# Patient Record
Sex: FEMALE | Race: WHITE | URBAN - METROPOLITAN AREA
[De-identification: names, ages, dates, MRNs, and addresses within clinical notes are randomized per-mention and may not be internally consistent; named-entity substitution may affect disease eponyms.]

---

## 2017-04-05 ENCOUNTER — IMPORTED ENCOUNTER (OUTPATIENT)
Dept: URBAN - METROPOLITAN AREA CLINIC 43 | Facility: CLINIC | Age: 82
End: 2017-04-05

## 2017-05-03 ENCOUNTER — IMPORTED ENCOUNTER (OUTPATIENT)
Dept: URBAN - METROPOLITAN AREA CLINIC 43 | Facility: CLINIC | Age: 82
End: 2017-05-03

## 2017-05-03 PROBLEM — H35.373: Noted: 2017-05-03

## 2017-05-03 PROBLEM — H43.813: Noted: 2017-05-03

## 2017-05-03 PROBLEM — H40.1131: Noted: 2017-05-03

## 2020-04-18 ASSESSMENT — VISUAL ACUITY
OS_CC: J1
OS_CC: J1+
OD_OTHER: 20/40.
OS_SC: 20/20 -2
OD_CC: J1+-2
OD_SC: 20/25 -2
OD_CC: J1+
OD_SC: 20/20 -3
OS_SC: 20/20 -3

## 2020-04-18 ASSESSMENT — KERATOMETRY
OS_K2POWER_DIOPTERS: 45.75
OS_AXISANGLE_DEGREES: 124
OD_AXISANGLE2_DEGREES: 173
OD_K2POWER_DIOPTERS: 45
OS_K1POWER_DIOPTERS: 46
OD_K1POWER_DIOPTERS: 45.75
OD_AXISANGLE_DEGREES: 83
OS_AXISANGLE2_DEGREES: 34

## 2020-04-18 ASSESSMENT — TONOMETRY
OS_IOP_MMHG: 12.0
OD_IOP_MMHG: 14.0

## 2021-02-11 NOTE — PATIENT DISCUSSION
BASELINE PHOTOS TODAY. DISCUSSED OPTION OF SLT REVIEWED WITH PT. WILL SCHEDULE PT FOR A GONIO UNDILATED . PT WILL CONTINUE WITH THE CURRENT MEDS FOR NOW.

## 2021-03-24 NOTE — PROCEDURE NOTE: SURGICAL
"<p style=""margin-bottom:0in;text-align:justify;line-height:normal;""><span>Prior to commencing surgery

## 2021-04-22 NOTE — PATIENT DISCUSSION
DOING WELL AFTER [facility] OU. WILL CONTINUE CARE W/DR. ESCALANTE AND DPM. IF CHANGES, SEND BACK TO [address] [facility].

## 2021-11-29 NOTE — PATIENT DISCUSSION
DOING WELL AFTER LPI OU. WILL CONTINUE CARE W/DR. ESCALANTE AND DPM. IF CHANGES, SEND BACK TO [address] [facility].

## 2022-04-01 NOTE — PATIENT DISCUSSION
DOING WELL AFTER LPI OU. WILL CONTINUE CARE W/DR. ESCALANTE AND DPM. IF CHANGES, SEND BACK TO [address] [facility].

## 2022-06-04 ENCOUNTER — TELEPHONE ENCOUNTER (OUTPATIENT)
Dept: URBAN - METROPOLITAN AREA CLINIC 68 | Facility: CLINIC | Age: 87
End: 2022-06-04

## 2022-06-04 RX ORDER — RIFAXIMIN 550 MG/1
TABLET ORAL
Qty: 42 | Refills: 0 | OUTPATIENT
Start: 2016-05-31 | End: 2016-06-14

## 2022-06-04 RX ORDER — POLYETHYLENE GLYCOL 3350, SODIUM SULFATE, SODIUM CHLORIDE, POTASSIUM CHLORIDE, ASCORBIC ACID, SODIUM ASCORBATE 7.5-2.691G
KIT ORAL
Qty: 1 | Refills: 0 | OUTPATIENT
Start: 2013-11-12 | End: 2015-02-12

## 2022-06-04 RX ORDER — AMOXICILLIN AND CLAVULANATE POTASSIUM 875; 125 MG/1; MG/1
TABLET, FILM COATED ORAL
Qty: 28 | Refills: 0 | OUTPATIENT
Start: 2015-02-12 | End: 2015-12-15

## 2022-06-04 RX ORDER — CLONIDINE HYDROCHLORIDE 0.2 MG/1
CLONIDINE HCL( 0.2MG ORAL  DAILY ) INACTIVE -HX ENTRY TABLET ORAL DAILY
OUTPATIENT
Start: 2015-12-15

## 2022-06-04 RX ORDER — VERAPAMIL HYDROCHLORIDE 240 MG/1
VERAPAMIL HCL ER( 240MG ORAL  DAILY ) INACTIVE -HX ENTRY CAPSULE, DELAYED RELEASE PELLETS ORAL DAILY
OUTPATIENT
Start: 2015-12-15

## 2022-06-04 RX ORDER — DICYCLOMINE HYDROCHLORIDE 20 MG/1
TABLET ORAL
Qty: 60 | Refills: 0 | OUTPATIENT
Start: 2015-02-12 | End: 2015-12-15

## 2022-06-05 ENCOUNTER — TELEPHONE ENCOUNTER (OUTPATIENT)
Dept: URBAN - METROPOLITAN AREA CLINIC 68 | Facility: CLINIC | Age: 87
End: 2022-06-05

## 2022-06-05 RX ORDER — DIPHENOXYLATE HCL/ATROPINE 2.5-.025MG
TABLET ORAL
Qty: 60 | Refills: 60 | Status: ACTIVE | COMMUNITY
Start: 2016-07-05

## 2022-06-05 RX ORDER — LEVOTHYROXINE SODIUM 75 UG/1
SYNTHROID( 75MCG ORAL  DAILY ) ACTIVE -HX ENTRY TABLET ORAL DAILY
Status: ACTIVE | COMMUNITY
Start: 2016-07-05

## 2022-06-05 RX ORDER — LEVOTHYROXINE SODIUM 0.07 MG/1
LEVOTHYROXINE SODIUM( 75MCG ORAL  DAILY ) ACTIVE -HX ENTRY TABLET ORAL DAILY
Status: ACTIVE | COMMUNITY
Start: 2016-07-05

## 2022-06-05 RX ORDER — AMLODIPINE BESYLATE 5 MG/1
AMLODIPINE BESYLATE( 5MG ORAL  DAILY ) ACTIVE -HX ENTRY TABLET ORAL DAILY
Status: ACTIVE | COMMUNITY
Start: 2016-07-05

## 2022-06-05 RX ORDER — METOPROLOL TARTRATE 25 MG/1
METOPROLOL TARTRATE( 25MG ORAL  DAILY ) ACTIVE -HX ENTRY TABLET, FILM COATED ORAL DAILY
Status: ACTIVE | COMMUNITY
Start: 2016-07-05

## 2022-06-05 RX ORDER — ATORVASTATIN CALCIUM 40 MG/1
ATORVASTATIN CALCIUM( 40MG ORAL  DAILY ) ACTIVE -HX ENTRY TABLET, FILM COATED ORAL DAILY
Status: ACTIVE | COMMUNITY
Start: 2016-07-05

## 2022-06-05 RX ORDER — WARFARIN 2 MG/1
WARFARIN SODIUM( 2MG ORAL 2-3 MG DAILY ) ACTIVE -HX ENTRY TABLET ORAL DAILY
Status: ACTIVE | COMMUNITY
Start: 2016-07-05

## 2022-06-05 RX ORDER — COLESEVELAM HYDROCHLORIDE 625 MG/1
TABLET, FILM COATED ORAL
Qty: 60 | Refills: 60 | Status: ACTIVE | COMMUNITY
Start: 2016-03-29

## 2022-06-05 RX ORDER — FUROSEMIDE 40 MG/1
FUROSEMIDE( 40MG ORAL  DAILY ) ACTIVE -HX ENTRY TABLET ORAL DAILY
Status: ACTIVE | COMMUNITY
Start: 2016-07-05

## 2022-06-05 RX ORDER — POTASSIUM CHLORIDE 750 MG/1
POTASSIUM CHLORIDE ER( 10MEQ ORAL 20MEQ DAILY ) ACTIVE -HX ENTRY CAPSULE ORAL DAILY
Status: ACTIVE | COMMUNITY
Start: 2016-07-05

## 2022-06-25 ENCOUNTER — TELEPHONE ENCOUNTER (OUTPATIENT)
Age: 87
End: 2022-06-25

## 2022-06-25 RX ORDER — AMOXICILLIN AND CLAVULANATE POTASSIUM 875; 125 MG/1; MG/1
TABLET, COATED ORAL
Qty: 28 | Refills: 0 | OUTPATIENT
Start: 2015-02-12 | End: 2015-12-15

## 2022-06-25 RX ORDER — VERAPAMIL HYDROCHLORIDE 240 MG/1
VERAPAMIL HCL ER( 240MG ORAL  DAILY ) INACTIVE -HX ENTRY CAPSULE, DELAYED RELEASE PELLETS ORAL DAILY
OUTPATIENT
Start: 2015-12-15

## 2022-06-25 RX ORDER — RIFAXIMIN 550 MG/1
TABLET ORAL
Qty: 42 | Refills: 0 | OUTPATIENT
Start: 2016-05-31 | End: 2016-06-14

## 2022-06-25 RX ORDER — POLYETHYLENE GLYCOL 3350, SODIUM SULFATE, SODIUM CHLORIDE, POTASSIUM CHLORIDE, ASCORBIC ACID, SODIUM ASCORBATE 7.5-2.691G
KIT ORAL
Qty: 1 | Refills: 0 | OUTPATIENT
Start: 2013-11-12 | End: 2015-02-12

## 2022-06-25 RX ORDER — DICYCLOMINE HYDROCHLORIDE 20 MG/1
TABLET ORAL
Qty: 60 | Refills: 0 | OUTPATIENT
Start: 2015-02-12 | End: 2015-12-15

## 2022-06-25 RX ORDER — CLONIDINE HYDROCHLORIDE 0.2 MG/1
CLONIDINE HCL( 0.2MG ORAL  DAILY ) INACTIVE -HX ENTRY TABLET ORAL DAILY
OUTPATIENT
Start: 2015-12-15

## 2022-06-26 ENCOUNTER — TELEPHONE ENCOUNTER (OUTPATIENT)
Age: 87
End: 2022-06-26

## 2022-06-26 RX ORDER — METOPROLOL TARTRATE 25 MG/1
METOPROLOL TARTRATE( 25MG ORAL  DAILY ) ACTIVE -HX ENTRY TABLET, FILM COATED ORAL DAILY
Status: ACTIVE | COMMUNITY
Start: 2016-07-05

## 2022-06-26 RX ORDER — FUROSEMIDE 40 MG/1
FUROSEMIDE( 40MG ORAL  DAILY ) ACTIVE -HX ENTRY TABLET ORAL DAILY
Status: ACTIVE | COMMUNITY
Start: 2016-07-05

## 2022-06-26 RX ORDER — ATORVASTATIN CALCIUM 40 MG/1
ATORVASTATIN CALCIUM( 40MG ORAL  DAILY ) ACTIVE -HX ENTRY TABLET, FILM COATED ORAL DAILY
Status: ACTIVE | COMMUNITY
Start: 2016-07-05

## 2022-06-26 RX ORDER — WARFARIN 2 MG/1
WARFARIN SODIUM( 2MG ORAL 2-3 MG DAILY ) ACTIVE -HX ENTRY TABLET ORAL DAILY
Status: ACTIVE | COMMUNITY
Start: 2016-07-05

## 2022-06-26 RX ORDER — LEVOTHYROXINE SODIUM 75 MCG
SYNTHROID( 75MCG ORAL  DAILY ) ACTIVE -HX ENTRY TABLET ORAL DAILY
Status: ACTIVE | COMMUNITY
Start: 2016-07-05

## 2022-06-26 RX ORDER — LEVOTHYROXINE SODIUM 0.07 MG/1
LEVOTHYROXINE SODIUM( 75MCG ORAL  DAILY ) ACTIVE -HX ENTRY TABLET ORAL DAILY
Status: ACTIVE | COMMUNITY
Start: 2016-07-05

## 2022-06-26 RX ORDER — DIPHENOXYLATE HCL/ATROPINE 2.5-.025MG
TABLET ORAL
Qty: 60 | Refills: 60 | Status: ACTIVE | COMMUNITY
Start: 2016-07-05

## 2022-06-26 RX ORDER — POTASSIUM CHLORIDE 750 MG/1
POTASSIUM CHLORIDE ER( 10MEQ ORAL 20MEQ DAILY ) ACTIVE -HX ENTRY CAPSULE, EXTENDED RELEASE ORAL DAILY
Status: ACTIVE | COMMUNITY
Start: 2016-07-05

## 2022-06-26 RX ORDER — COLESEVELAM HYDROCHLORIDE 625 MG/1
TABLET, FILM COATED ORAL
Qty: 60 | Refills: 60 | Status: ACTIVE | COMMUNITY
Start: 2016-03-29

## 2022-06-26 RX ORDER — AMLODIPINE BESYLATE 5 MG/1
AMLODIPINE BESYLATE( 5MG ORAL  DAILY ) ACTIVE -HX ENTRY TABLET ORAL DAILY
Status: ACTIVE | COMMUNITY
Start: 2016-07-05

## 2023-01-04 NOTE — PATIENT DISCUSSION
DOING WELL AFTER LPI OU. WILL CONTINUE CARE W/DR. ESCALANTE AND DPM. IF CHANGES, SEND BACK TO [address] [facility].